# Patient Record
Sex: MALE | Race: WHITE | NOT HISPANIC OR LATINO
[De-identification: names, ages, dates, MRNs, and addresses within clinical notes are randomized per-mention and may not be internally consistent; named-entity substitution may affect disease eponyms.]

---

## 2017-07-10 PROBLEM — Z00.00 ENCOUNTER FOR PREVENTIVE HEALTH EXAMINATION: Status: ACTIVE | Noted: 2017-07-10

## 2017-07-18 PROBLEM — G47.00 INSOMNIA, CONTROLLED: Status: ACTIVE | Noted: 2017-07-18

## 2017-07-18 PROBLEM — Z78.9 NON-SMOKER: Status: ACTIVE | Noted: 2017-07-18

## 2017-07-18 RX ORDER — LISINOPRIL 10 MG/1
10 TABLET ORAL DAILY
Refills: 0 | Status: ACTIVE | COMMUNITY

## 2017-07-20 ENCOUNTER — LABORATORY RESULT (OUTPATIENT)
Age: 82
End: 2017-07-20

## 2017-07-20 ENCOUNTER — APPOINTMENT (OUTPATIENT)
Dept: THORACIC SURGERY | Facility: CLINIC | Age: 82
End: 2017-07-20

## 2017-07-20 ENCOUNTER — OUTPATIENT (OUTPATIENT)
Dept: OUTPATIENT SERVICES | Facility: HOSPITAL | Age: 82
LOS: 1 days | End: 2017-07-20

## 2017-07-20 VITALS
RESPIRATION RATE: 18 BRPM | WEIGHT: 174 LBS | SYSTOLIC BLOOD PRESSURE: 169 MMHG | OXYGEN SATURATION: 98 % | BODY MASS INDEX: 30.83 KG/M2 | DIASTOLIC BLOOD PRESSURE: 75 MMHG | TEMPERATURE: 97.5 F | HEIGHT: 63 IN | HEART RATE: 76 BPM

## 2017-07-20 DIAGNOSIS — M19.90 UNSPECIFIED OSTEOARTHRITIS, UNSPECIFIED SITE: ICD-10-CM

## 2017-07-20 DIAGNOSIS — Z78.9 OTHER SPECIFIED HEALTH STATUS: ICD-10-CM

## 2017-07-20 DIAGNOSIS — I82.409 ACUTE EMBOLISM AND THROMBOSIS OF UNSPECIFIED DEEP VEINS OF UNSPECIFIED LOWER EXTREMITY: ICD-10-CM

## 2017-07-20 DIAGNOSIS — G47.00 INSOMNIA, UNSPECIFIED: ICD-10-CM

## 2017-07-21 PROBLEM — M19.90 OSTEOARTHRITIS: Status: ACTIVE | Noted: 2017-07-18

## 2017-07-22 ENCOUNTER — FORM ENCOUNTER (OUTPATIENT)
Age: 82
End: 2017-07-22

## 2017-07-23 ENCOUNTER — OUTPATIENT (OUTPATIENT)
Dept: OUTPATIENT SERVICES | Facility: HOSPITAL | Age: 82
LOS: 1 days | End: 2017-07-23
Payer: MEDICARE

## 2017-07-23 PROCEDURE — 78815 PET IMAGE W/CT SKULL-THIGH: CPT

## 2017-07-23 PROCEDURE — A9552: CPT

## 2017-07-23 PROCEDURE — 78815 PET IMAGE W/CT SKULL-THIGH: CPT | Mod: 26

## 2017-07-25 ENCOUNTER — FORM ENCOUNTER (OUTPATIENT)
Age: 82
End: 2017-07-25

## 2017-07-26 ENCOUNTER — OUTPATIENT (OUTPATIENT)
Dept: OUTPATIENT SERVICES | Facility: HOSPITAL | Age: 82
LOS: 1 days | End: 2017-07-26
Payer: MEDICARE

## 2017-07-26 PROCEDURE — 75561 CARDIAC MRI FOR MORPH W/DYE: CPT

## 2017-07-26 PROCEDURE — A9577: CPT

## 2017-07-26 PROCEDURE — 75561 CARDIAC MRI FOR MORPH W/DYE: CPT | Mod: 26

## 2017-07-27 ENCOUNTER — FORM ENCOUNTER (OUTPATIENT)
Age: 82
End: 2017-07-27

## 2017-07-28 ENCOUNTER — OUTPATIENT (OUTPATIENT)
Dept: OUTPATIENT SERVICES | Facility: HOSPITAL | Age: 82
LOS: 1 days | End: 2017-07-28
Payer: MEDICARE

## 2017-07-28 DIAGNOSIS — R91.1 SOLITARY PULMONARY NODULE: ICD-10-CM

## 2017-07-28 PROCEDURE — 93306 TTE W/DOPPLER COMPLETE: CPT | Mod: 26

## 2017-07-28 PROCEDURE — 71275 CT ANGIOGRAPHY CHEST: CPT

## 2017-07-28 PROCEDURE — 94726 PLETHYSMOGRAPHY LUNG VOLUMES: CPT | Mod: 26

## 2017-07-28 PROCEDURE — 94060 EVALUATION OF WHEEZING: CPT

## 2017-07-28 PROCEDURE — 94010 BREATHING CAPACITY TEST: CPT | Mod: 26

## 2017-07-28 PROCEDURE — 94729 DIFFUSING CAPACITY: CPT

## 2017-07-28 PROCEDURE — 71275 CT ANGIOGRAPHY CHEST: CPT | Mod: 26

## 2017-07-28 PROCEDURE — 94760 N-INVAS EAR/PLS OXIMETRY 1: CPT

## 2017-07-28 PROCEDURE — 94729 DIFFUSING CAPACITY: CPT | Mod: 26

## 2017-07-28 PROCEDURE — 94726 PLETHYSMOGRAPHY LUNG VOLUMES: CPT

## 2017-07-28 PROCEDURE — 93306 TTE W/DOPPLER COMPLETE: CPT

## 2017-08-03 ENCOUNTER — APPOINTMENT (OUTPATIENT)
Dept: THORACIC SURGERY | Facility: CLINIC | Age: 82
End: 2017-08-03
Payer: MEDICARE

## 2017-08-03 VITALS
HEART RATE: 90 BPM | SYSTOLIC BLOOD PRESSURE: 175 MMHG | OXYGEN SATURATION: 91 % | BODY MASS INDEX: 30.11 KG/M2 | WEIGHT: 170 LBS | RESPIRATION RATE: 21 BRPM | DIASTOLIC BLOOD PRESSURE: 76 MMHG | TEMPERATURE: 97 F

## 2017-08-03 PROCEDURE — 99214 OFFICE O/P EST MOD 30 MIN: CPT

## 2017-09-10 ENCOUNTER — FORM ENCOUNTER (OUTPATIENT)
Age: 82
End: 2017-09-10

## 2017-09-11 ENCOUNTER — OUTPATIENT (OUTPATIENT)
Dept: OUTPATIENT SERVICES | Facility: HOSPITAL | Age: 82
LOS: 1 days | End: 2017-09-11
Payer: MEDICARE

## 2017-09-11 PROCEDURE — 71260 CT THORAX DX C+: CPT | Mod: 26

## 2017-09-11 PROCEDURE — 71260 CT THORAX DX C+: CPT

## 2017-09-14 ENCOUNTER — APPOINTMENT (OUTPATIENT)
Dept: THORACIC SURGERY | Facility: CLINIC | Age: 82
End: 2017-09-14

## 2017-09-22 ENCOUNTER — FORM ENCOUNTER (OUTPATIENT)
Age: 82
End: 2017-09-22

## 2018-01-21 ENCOUNTER — FORM ENCOUNTER (OUTPATIENT)
Age: 83
End: 2018-01-21

## 2018-01-22 ENCOUNTER — APPOINTMENT (OUTPATIENT)
Dept: CT IMAGING | Facility: HOSPITAL | Age: 83
End: 2018-01-22

## 2018-01-22 ENCOUNTER — OUTPATIENT (OUTPATIENT)
Dept: OUTPATIENT SERVICES | Facility: HOSPITAL | Age: 83
LOS: 1 days | End: 2018-01-22
Payer: MEDICARE

## 2018-01-22 PROCEDURE — 71260 CT THORAX DX C+: CPT | Mod: 26

## 2018-01-22 PROCEDURE — 71260 CT THORAX DX C+: CPT

## 2018-02-05 DIAGNOSIS — R91.1 SOLITARY PULMONARY NODULE: ICD-10-CM

## 2019-01-08 ENCOUNTER — FORM ENCOUNTER (OUTPATIENT)
Age: 84
End: 2019-01-08

## 2019-01-09 ENCOUNTER — OUTPATIENT (OUTPATIENT)
Dept: OUTPATIENT SERVICES | Facility: HOSPITAL | Age: 84
LOS: 1 days | End: 2019-01-09
Payer: MEDICARE

## 2019-01-09 ENCOUNTER — APPOINTMENT (OUTPATIENT)
Dept: CT IMAGING | Facility: HOSPITAL | Age: 84
End: 2019-01-09
Payer: MEDICARE

## 2019-01-09 PROCEDURE — 71275 CT ANGIOGRAPHY CHEST: CPT | Mod: 26

## 2019-01-09 PROCEDURE — 71275 CT ANGIOGRAPHY CHEST: CPT

## 2019-01-17 ENCOUNTER — APPOINTMENT (OUTPATIENT)
Dept: THORACIC SURGERY | Facility: CLINIC | Age: 84
End: 2019-01-17
Payer: MEDICARE

## 2019-01-17 VITALS
HEART RATE: 98 BPM | DIASTOLIC BLOOD PRESSURE: 76 MMHG | WEIGHT: 164 LBS | TEMPERATURE: 97.4 F | BODY MASS INDEX: 29.05 KG/M2 | RESPIRATION RATE: 20 BRPM | SYSTOLIC BLOOD PRESSURE: 148 MMHG | OXYGEN SATURATION: 94 %

## 2019-01-17 DIAGNOSIS — Q25.79 OTHER CONGENITAL MALFORMATIONS OF PULMONARY ARTERY: ICD-10-CM

## 2019-01-17 DIAGNOSIS — I27.82 CHRONIC PULMONARY EMBOLISM: ICD-10-CM

## 2019-01-17 PROCEDURE — 99213 OFFICE O/P EST LOW 20 MIN: CPT

## 2019-01-17 NOTE — PHYSICAL EXAM
[] : no respiratory distress [Respiration, Rhythm And Depth] : normal respiratory rhythm and effort [Exaggerated Use Of Accessory Muscles For Inspiration] : no accessory muscle use [Apical Impulse] : the apical impulse was normal [Heart Rate And Rhythm] : heart rate was normal and rhythm regular [Heart Sounds] : normal S1 and S2 [2+] : left 2+ [Abnormal Walk] : normal gait [Oriented To Time, Place, And Person] : oriented to person, place, and time

## 2019-01-18 PROBLEM — I27.82 CHRONIC PULMONARY EMBOLISM: Status: ACTIVE | Noted: 2018-02-05

## 2019-01-18 PROBLEM — Q25.79 PULMONARY ARTERY ABNORMALITY: Status: ACTIVE | Noted: 2017-07-21

## 2019-01-18 NOTE — HISTORY OF PRESENT ILLNESS
[FreeTextEntry1] : 85 year old male with history of hypertension, hyperlipidemia, peripheral venous insufficiency, DVT (Eliquis x 6 months) , and a RIGHT thoracotomy for removal of a benign mass done at [facility] over 20 years ago presents today presents today to review recent CT scan. He was initially referred for lung mass evaluation by Dr. Da Jain. \par \par CT scan with contrast done on July 6, 2017 revealed a 2.5 x 0.9 cm left lower lobe medial opacity which contacts the pleura, possibly representing round atelectasis versus neoplasm. There are also bilateral subpleural subcentimeter noncalcified pulmonary nodules, and marked emphysema. It also showed a large filling defect in the the LEFT main pulmonary artery, measuring 4.3 cm x 2.6 cm in transverse dimension.\par \par On his last visit, the imaging was reviewed. The abnormality in the left pulmonary artery appeared to be adherent to the medial wall of the left pulmonary artery in the region of the bifurcation of the left main bronchus. It was not clear whether or not this represents pulmonary artery thrombus with adherence to the wall (unlikely), or this represents a tumor of the pulmonary artery in origin, possible sarcoma.\par \par Cardiac MRI July 26, 2017 revealed an area of low signal intensity measuring approximately 32 x 16mm in the left pulmonary artery, but doesn’t enhance with contrast. Differential diagnoses include tumor invading the left pulmonary artery versus a thrombus in situ.\par \par  PET scan July 23, 2017 revealed no FDG activity in the left main pulmonary artery, 3.2 cm mildly dilated pulmonary artery suggestive of pulmonary hypertension.\par \par  CTA chest on July 28, 2017 revealed aortic root 4.2cm, ascending aorta 3.4 x 3.7cm, descending thoracic aorta 3.7cm, and numerous mediastinal lymph nodes , measuring up to 1.0cm right lower paratracheal (unchanged) and probable stable 1cm right hilar lymph node, and again a large eccentrically located main left pulmonary artery extending into the left lower lobe/intralobal branch.\par \par  Echo July 28, 2017 revealed EF 60-65%, no valvular disease, no pericardial effusion, no aortic root dilation. \par \par CT angio chest completed on 1/9/19:\par - progressive interval decrease in a chronic peripheral pulmonary embolism within the left lateral wall of the left main pulmonary artery. No new pulmonary emboli are identified. \par - persistent moderate pulmonary artery dilatation unchanged  from prior study as well as right ventricular enlargement and right ventricular failure cannot be excluded. 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 \par - multiple stable solid parenchymal nodules in the RIGHT lower lobe measuring up to 7 mm. \par \par Patient denies cough, hemoptysis, shortness of breath, weight change, nausea, vomiting, diarrhea, chest pain, fever, or any recent illnesses or hospitalizations.

## 2019-01-18 NOTE — PROCEDURE
[FreeTextEntry1] : EXAM:  CT ANGIO CHEST PE PROTOCOL IC                      \par \par PROCEDURE DATE:  01/09/2019  \par \par \par \par INTERPRETATION:  CTA (CT angiography) of the CHEST dated 1/9/2019 7:12 PM\par \par INDICATION: Assess for pulmonary embolism.\par \par TECHNIQUE: CT angiography of the chest was performed during bolus \par injection of intravenous contrast.  Post-processing including the \par production of axial, coronal and sagittal multiplanar reformatted images \par and axial and coronal maximum intensity projections (MIPs) was performed. \par 90 cc of Optiray 350 used, 10 cc discarded.\par \par PRIOR STUDY: CT of the chest from 1/22/2018, and 9/11/2017.\par \par FINDINGS: No new pulmonary embolism is seen, however eccentric filling \par defect seen in the left main pulmonary artery is greatly diminished \par compared to the prior study now measuring 2.2 x 0.8 cm (AP x TV) .  Main \par pulmonary trunk enlarged and unchanged from prior imaging measuring 3.6 \par cm at the bifurcation (series 6, image 63). Continued evidence of right \par ventricular prominence and right ventricular failure cannot be excluded. \par Aortic root is dilated measuring 4.2 x 4.1 cm which is stable from \par previous study and the rest of the thoracic aorta normal in size. \par Moderate amount of calcifications are seen in the thoracic aorta, \par abdominal aorta and its branches. The heart is normal in size. No \par pericardial effusion is seen. No mediastinal, hilar or axillary \par lymphadenopathy is seen.\par \par No pleural effusions are seen. No change in confluent and substantial \par paraseptal emphysematous changes most pronounced in the right upper lung \par zone. Pleural plaques are again seen over the mid and lower lung zones \par bilaterally and unchanged from prior study. Again noted is a curvilinear \par parenchymal and in the posterior basal segment of the left lower lobe. \par Early asbestosis cannot be excluded. No significant change in multiple \par solid centimeter nodules predominantly within the right lower lobe the \par largest measuring 7 mm (series 5, image 180). \par \par Limited evaluation of the upper abdomen demonstrates no abnormality. \par \par Evaluation of the osseous structures demonstrates mild degenerative \par changes. Demonstrated are chronic left-sided fifth and sixth rib \par deformities.\par \par \par IMPRESSION: \par \par 1.  Progressive interval decrease in a chronic peripheral pulmonary \par embolism within the left lateral wall of the left main pulmonary artery. \par No new pulmonary emboli are identified.\par 2.  Persistent moderate pulmonary artery dilatation unchanged from prior \par study as well as right ventricular enlargement and right ventricular \par failure cannot be excluded. \par 3.  No evidence of this best is related pleural disease with possible \par early asbestosis characterized by parenchymal band and subpleural lines \par predominantly in the left lower lobe. No significant pulmonary fibrosis \par however is identified.\par 4.  Multiple stable solid parenchymal nodules in the right lower lobe \par measuring up to 7 mm. In a high risk individual, as per Fleischner \par society 2017 guidelines, continued interval surveillance to document \par two-year stability and an additional surveillance thoracic CT in 12 \par months is suggested. \par \par \par \par \par \par \par "Thank you for the opportunity to participate in the care of this \par patient."\par \par ROHITH CONNELL M.D., RADIOLOGY RESIDENT\par This document has been electronically signed.\par JUAN BROWN M.D., ATTENDING RADIOLOGIST\par This document has been electronically signed. Gus 10 2019 11:31AM

## 2019-01-18 NOTE — ASSESSMENT
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 \par \par CTA was reviewed with the patient and daughter in detail. There is interval decrease in the size of the pulmonary embolism, and he is currently asymptomatic. He is being managed by Dr. Centeno from a hematology perspective and is being managed on Xarelto for anticoagulation.    The patient has had no intercurrent illness and feels great, the same.\par \par Patient complained for occasional body aches and will follow up with Dr. Jain for Rheumatologic management. \par \par Patient was advised that if he develops increased shortness of breath, chest pain or palpitations to have evaluated in the ER immediately. \par \par Pulmonary nodules stable.  Asbestos exposure noted.  All findings discussed with the patient and his daughter.\par \par Plan:\par 1. Repeat CTA chest in one year with a follow up visit

## 2020-01-15 ENCOUNTER — APPOINTMENT (OUTPATIENT)
Dept: CT IMAGING | Facility: HOSPITAL | Age: 85
End: 2020-01-15

## 2022-01-01 ENCOUNTER — NON-APPOINTMENT (OUTPATIENT)
Age: 87
End: 2022-01-01

## 2022-01-01 ENCOUNTER — APPOINTMENT (OUTPATIENT)
Dept: HEART AND VASCULAR | Facility: CLINIC | Age: 87
End: 2022-01-01

## 2022-01-01 ENCOUNTER — TRANSCRIPTION ENCOUNTER (OUTPATIENT)
Age: 87
End: 2022-01-01

## 2022-01-01 ENCOUNTER — APPOINTMENT (OUTPATIENT)
Dept: PULMONOLOGY | Facility: CLINIC | Age: 87
End: 2022-01-01

## 2022-01-01 VITALS
HEIGHT: 63 IN | DIASTOLIC BLOOD PRESSURE: 71 MMHG | OXYGEN SATURATION: 96 % | TEMPERATURE: 97 F | HEART RATE: 75 BPM | SYSTOLIC BLOOD PRESSURE: 110 MMHG

## 2022-01-01 VITALS
SYSTOLIC BLOOD PRESSURE: 88 MMHG | TEMPERATURE: 97.9 F | DIASTOLIC BLOOD PRESSURE: 55 MMHG | HEIGHT: 63 IN | OXYGEN SATURATION: 96 % | HEART RATE: 64 BPM | RESPIRATION RATE: 12 BRPM

## 2022-01-01 DIAGNOSIS — I82.409 ACUTE EMBOLISM AND THROMBOSIS OF UNSPECIFIED DEEP VEINS OF UNSPECIFIED LOWER EXTREMITY: ICD-10-CM

## 2022-01-01 DIAGNOSIS — I10 ESSENTIAL (PRIMARY) HYPERTENSION: ICD-10-CM

## 2022-01-01 DIAGNOSIS — I48.20 CHRONIC ATRIAL FIBRILLATION, UNSP: ICD-10-CM

## 2022-01-01 DIAGNOSIS — R91.8 OTHER NONSPECIFIC ABNORMAL FINDING OF LUNG FIELD: ICD-10-CM

## 2022-01-01 DIAGNOSIS — J96.11 CHRONIC RESPIRATORY FAILURE WITH HYPOXIA: ICD-10-CM

## 2022-01-01 DIAGNOSIS — J61 PNEUMOCONIOSIS DUE TO ASBESTOS AND OTHER MINERAL FIBERS: ICD-10-CM

## 2022-01-01 DIAGNOSIS — I27.20 PULMONARY HYPERTENSION, UNSPECIFIED: ICD-10-CM

## 2022-01-01 DIAGNOSIS — I50.32 CHRONIC DIASTOLIC (CONGESTIVE) HEART FAILURE: ICD-10-CM

## 2022-01-01 DIAGNOSIS — J90 PLEURAL EFFUSION, NOT ELSEWHERE CLASSIFIED: ICD-10-CM

## 2022-01-01 DIAGNOSIS — I82.5Y1 CHRONIC EMBOLISM AND THROMBOSIS OF UNSPECIFIED DEEP VEINS OF RIGHT PROXIMAL LOWER EXTREMITY: ICD-10-CM

## 2022-01-01 DIAGNOSIS — E78.00 PURE HYPERCHOLESTEROLEMIA, UNSPECIFIED: ICD-10-CM

## 2022-01-01 DIAGNOSIS — I87.2 VENOUS INSUFFICIENCY (CHRONIC) (PERIPHERAL): ICD-10-CM

## 2022-01-01 LAB
ANION GAP SERPL CALC-SCNC: 10 MMOL/L
BUN SERPL-MCNC: 35 MG/DL
CALCIUM SERPL-MCNC: 9 MG/DL
CHLORIDE SERPL-SCNC: 102 MMOL/L
CO2 SERPL-SCNC: 31 MMOL/L
CREAT SERPL-MCNC: 0.93 MG/DL
EGFR: 78 ML/MIN/1.73M2
GLUCOSE SERPL-MCNC: 98 MG/DL
NT-PROBNP SERPL-MCNC: ABNORMAL PG/ML
POTASSIUM SERPL-SCNC: 5.6 MMOL/L
SODIUM SERPL-SCNC: 143 MMOL/L

## 2022-01-01 PROCEDURE — 99205 OFFICE O/P NEW HI 60 MIN: CPT | Mod: 25

## 2022-01-01 PROCEDURE — 93306 TTE W/DOPPLER COMPLETE: CPT

## 2022-01-01 PROCEDURE — 93000 ELECTROCARDIOGRAM COMPLETE: CPT

## 2022-01-01 PROCEDURE — 99204 OFFICE O/P NEW MOD 45 MIN: CPT

## 2022-01-01 PROCEDURE — 36415 COLL VENOUS BLD VENIPUNCTURE: CPT

## 2022-01-01 PROCEDURE — 71045 X-RAY EXAM CHEST 1 VIEW: CPT

## 2022-01-01 RX ORDER — SIMVASTATIN 20 MG/1
20 TABLET, FILM COATED ORAL
Refills: 0 | Status: DISCONTINUED | COMMUNITY
End: 2022-01-01

## 2022-01-01 RX ORDER — ASPIRIN 81 MG/1
81 TABLET ORAL DAILY
Refills: 0 | Status: DISCONTINUED | COMMUNITY
End: 2022-01-01

## 2022-01-01 RX ORDER — FOLIC ACID 1 MG/1
1 TABLET ORAL
Qty: 30 | Refills: 0 | Status: DISCONTINUED | COMMUNITY
Start: 2022-01-01 | End: 2022-01-01

## 2022-01-01 RX ORDER — IPRATROPIUM BROMIDE 0.5 MG/2.5ML
0.02 SOLUTION RESPIRATORY (INHALATION)
Qty: 188 | Refills: 0 | Status: DISCONTINUED | COMMUNITY
Start: 2022-01-01 | End: 2022-01-01

## 2022-01-01 RX ORDER — IPRATROPIUM BROMIDE AND ALBUTEROL SULFATE 2.5; .5 MG/3ML; MG/3ML
0.5-2.5 (3) SOLUTION RESPIRATORY (INHALATION)
Qty: 360 | Refills: 0 | Status: DISCONTINUED | COMMUNITY
Start: 2022-01-01 | End: 2022-01-01

## 2022-01-01 RX ORDER — AMLODIPINE BESYLATE 10 MG/1
10 TABLET ORAL DAILY
Qty: 1 | Refills: 0 | Status: DISCONTINUED | COMMUNITY
End: 2022-01-01

## 2022-01-01 RX ORDER — METOPROLOL SUCCINATE 100 MG/1
100 TABLET, EXTENDED RELEASE ORAL
Qty: 90 | Refills: 0 | Status: ACTIVE | COMMUNITY
Start: 2022-01-01

## 2022-01-01 RX ORDER — TRAMADOL HYDROCHLORIDE AND ACETAMINOPHEN 37.5; 325 MG/1; MG/1
37.5-325 TABLET, FILM COATED ORAL
Qty: 120 | Refills: 0 | Status: ACTIVE | COMMUNITY
Start: 2022-01-01

## 2022-01-01 RX ORDER — RIVAROXABAN 20 MG/1
20 TABLET, FILM COATED ORAL
Qty: 90 | Refills: 1 | Status: ACTIVE | COMMUNITY

## 2022-01-01 RX ORDER — MECLIZINE HYDROCHLORIDE 25 MG/1
25 TABLET ORAL
Refills: 0 | Status: DISCONTINUED | COMMUNITY
End: 2022-01-01

## 2022-01-01 RX ORDER — SPIRONOLACTONE 25 MG/1
25 TABLET ORAL TWICE DAILY
Refills: 0 | Status: DISCONTINUED | COMMUNITY
End: 2022-01-01

## 2022-01-01 RX ORDER — RIOCIGUAT 1.5 MG/1
1.5 TABLET, FILM COATED ORAL
Qty: 90 | Refills: 11 | Status: ACTIVE | COMMUNITY
Start: 2022-01-01 | End: 1900-01-01

## 2022-01-01 RX ORDER — PREDNISONE 5 MG/1
5 TABLET ORAL
Qty: 60 | Refills: 0 | Status: DISCONTINUED | COMMUNITY
Start: 2022-01-01 | End: 2022-01-01

## 2022-01-01 RX ORDER — TRAZODONE HYDROCHLORIDE 50 MG/1
50 TABLET ORAL
Qty: 30 | Refills: 0 | Status: DISCONTINUED | COMMUNITY
Start: 2022-01-01 | End: 2022-01-01

## 2022-01-01 RX ORDER — FUROSEMIDE 40 MG/1
40 TABLET ORAL
Qty: 90 | Refills: 0 | Status: ACTIVE | COMMUNITY
Start: 2022-01-01

## 2022-07-06 PROBLEM — I10 HIGH BLOOD PRESSURE: Status: ACTIVE | Noted: 2017-07-18

## 2022-07-06 PROBLEM — I48.20 CHRONIC ATRIAL FIBRILLATION: Status: ACTIVE | Noted: 2022-01-01

## 2022-07-06 PROBLEM — I87.2 PERIPHERAL VENOUS INSUFFICIENCY: Status: ACTIVE | Noted: 2017-07-18

## 2022-07-06 PROBLEM — I50.32 CHRONIC DIASTOLIC CONGESTIVE HEART FAILURE: Status: ACTIVE | Noted: 2022-01-01

## 2022-07-06 PROBLEM — E78.00 HIGH CHOLESTEROL: Status: ACTIVE | Noted: 2017-07-18

## 2022-07-06 NOTE — PHYSICAL EXAM
[Well Developed] : well developed [Well Nourished] : well nourished [No Acute Distress] : no acute distress [Normal Conjunctiva] : normal conjunctiva [Normal Venous Pressure] : normal venous pressure [No Carotid Bruit] : no carotid bruit [No Murmur] : no murmur [No Rub] : no rub [No Gallop] : no gallop [Clear Lung Fields] : clear lung fields [Good Air Entry] : good air entry [No Respiratory Distress] : no respiratory distress  [Soft] : abdomen soft [Non Tender] : non-tender [No Masses/organomegaly] : no masses/organomegaly [Normal Bowel Sounds] : normal bowel sounds [Normal Gait] : normal gait [No Cyanosis] : no cyanosis [No Clubbing] : no clubbing [No Varicosities] : no varicosities [No Rash] : no rash [No Skin Lesions] : no skin lesions [Moves all extremities] : moves all extremities [No Focal Deficits] : no focal deficits [Normal Speech] : normal speech [Alert and Oriented] : alert and oriented [Normal memory] : normal memory [de-identified] : cataracts [de-identified] : Irreg [de-identified] : moderate LE edema

## 2022-07-06 NOTE — REASON FOR VISIT
[FreeTextEntry1] : 89 y/o M with HTN, HLD, ILD 2/T asbestosis DVT and AF. Last admission was April 2022 to [facility] with SOB, CT chest done.  Aortic plaque, emphysema, Asbestosis, dilated PAs. Getting "panic attacks" at night that wake him up SOB and make him sit up.  Hx more c/w PND and orthopnea.\par \par Echo and CMR in [facility] 2017 were excellent, dilated PAs noted\par Recent Chest CT available but no cardiac tests.  Daughter thinks he had a cath\par \par \par EKG: Atrial Fibrillation, IVCD, PRWP, RSR' with non specific ST-T wave abnormalities. 7/5/22

## 2022-07-06 NOTE — ASSESSMENT
[FreeTextEntry1] : HTN- controlled\par \par Nocturnal Sxs- most c/w PND, will await labs then plan to increase diuretic\par \par HLD- should be on statin, has aortic and coronary calcification\par \par CAF- continue AC\par \par PND/Orthopnea- PVC reported on a 2021 CT and has a recurrent right pleural effusion.  BNP sent.  Labs were drawn today in Office.

## 2022-07-06 NOTE — HISTORY OF PRESENT ILLNESS
[FreeTextEntry1] : Pulm- Dr Rivera\par Cardio- Dr Roberto Blake\par \par Daughter Gloria [phone]

## 2022-08-11 PROBLEM — J61 PULMONARY ASBESTOSIS: Status: ACTIVE | Noted: 2022-01-01

## 2022-08-11 PROBLEM — R91.8 MULTIPLE PULMONARY NODULES: Status: ACTIVE | Noted: 2018-02-05

## 2022-08-11 PROBLEM — J96.11 CHRONIC RESPIRATORY FAILURE WITH HYPOXIA: Status: ACTIVE | Noted: 2022-01-01

## 2022-08-11 PROBLEM — I82.5Y1: Status: ACTIVE | Noted: 2017-07-18

## 2022-08-11 PROBLEM — I82.409 DVT (DEEP VENOUS THROMBOSIS): Status: ACTIVE | Noted: 2017-07-20

## 2022-08-11 PROBLEM — J90 PLEURAL EFFUSION: Status: ACTIVE | Noted: 2022-01-01

## 2022-08-11 PROBLEM — I27.20 PULMONARY HYPERTENSION: Status: ACTIVE | Noted: 2022-01-01

## 2022-08-11 NOTE — REVIEW OF SYSTEMS
[Cough] : cough [Hemoptysis] : no hemoptysis [Frequent URIs] : no frequent URIs [Sputum] : no sputum [Dyspnea] : no dyspnea [Wheezing] : wheezing [A.M. Dry Mouth] : no a.m. dry mouth [SOB on Exertion] : sob on exertion [Negative] : Endocrine

## 2022-08-11 NOTE — PHYSICAL EXAM
[No Acute Distress] : no acute distress [Normal Oropharynx] : normal oropharynx [Normal Appearance] : normal appearance [No Neck Mass] : no neck mass [Normal Rate/Rhythm] : normal rate/rhythm [Normal S1, S2] : normal s1, s2 [No Murmurs] : no murmurs [No Resp Distress] : no resp distress [No Abnormalities] : no abnormalities [Benign] : benign [Normal Gait] : normal gait [No Clubbing] : no clubbing [No Cyanosis] : no cyanosis [No Edema] : no edema [FROM] : FROM [Normal Color/ Pigmentation] : normal color/ pigmentation [No Focal Deficits] : no focal deficits [Oriented x3] : oriented x3 [Normal Affect] : normal affect [TextBox_68] : scattered rales and decrease BS on RLL

## 2022-08-11 NOTE — PROCEDURE
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

## 2022-08-11 NOTE — END OF VISIT
[Time Spent: ___ minutes] : I have spent [unfilled] minutes of time on the encounter. [FreeTextEntry3] : answered the son and daughter questions and dictated a letter [>50% of the face to face encounter time was spent on counseling and/or coordination of care for ___] : Greater than 50% of the face to face encounter time was spent on counseling and/or coordination of care for [unfilled]

## 2022-08-11 NOTE — HISTORY OF PRESENT ILLNESS
[Former] : former [Never] : never [TextBox_4] : Short of breath.  The patient used the oxygen high flow around 6 L to maintain oxygen saturation 93%.  He is very short of breath even with sometimes at rest.  He is appetite is poor and is hard to eat.  The legs are swollen.  He has occasional cough.  He was admitted to the hospital few times.  The last time the drain fluid from the right chest.  And it was done before in the past.  He wants to know if there is anything can be done.